# Patient Record
Sex: MALE | Race: WHITE | URBAN - METROPOLITAN AREA
[De-identification: names, ages, dates, MRNs, and addresses within clinical notes are randomized per-mention and may not be internally consistent; named-entity substitution may affect disease eponyms.]

---

## 2019-08-14 ENCOUNTER — OFFICE VISIT (OUTPATIENT)
Dept: NEUROLOGY | Facility: CLINIC | Age: 34
End: 2019-08-14
Payer: COMMERCIAL

## 2019-08-14 VITALS
DIASTOLIC BLOOD PRESSURE: 70 MMHG | SYSTOLIC BLOOD PRESSURE: 108 MMHG | RESPIRATION RATE: 14 BRPM | WEIGHT: 186 LBS | HEART RATE: 70 BPM

## 2019-08-14 DIAGNOSIS — K50.00 CROHN'S DISEASE OF SMALL INTESTINE WITHOUT COMPLICATION (HCC): ICD-10-CM

## 2019-08-14 DIAGNOSIS — G43.109 MIGRAINE WITH AURA AND WITHOUT STATUS MIGRAINOSUS, NOT INTRACTABLE: Primary | ICD-10-CM

## 2019-08-14 PROCEDURE — 99204 OFFICE O/P NEW MOD 45 MIN: CPT | Performed by: OTHER

## 2019-08-14 RX ORDER — TOPIRAMATE 50 MG/1
TABLET, FILM COATED ORAL 2 TIMES DAILY
Refills: 6 | COMMUNITY
Start: 2019-06-27

## 2019-08-14 RX ORDER — SOLIFENACIN SUCCINATE 5 MG/1
5 TABLET, FILM COATED ORAL
Refills: 3 | COMMUNITY
Start: 2019-03-25

## 2019-08-14 RX ORDER — SUMATRIPTAN 50 MG/1
TABLET, FILM COATED ORAL AS NEEDED
Refills: 0 | COMMUNITY
Start: 2019-05-12 | End: 2020-03-17

## 2019-08-14 RX ORDER — LANSOPRAZOLE 30 MG/1
30 CAPSULE, DELAYED RELEASE ORAL
Refills: 2 | COMMUNITY
Start: 2019-05-24

## 2019-08-14 RX ORDER — KETOROLAC TROMETHAMINE 10 MG/1
TABLET, FILM COATED ORAL AS NEEDED
Refills: 0 | COMMUNITY
Start: 2019-05-16

## 2019-08-14 RX ORDER — MESALAMINE 375 MG/1
2 CAPSULE, EXTENDED RELEASE ORAL 2 TIMES DAILY
Refills: 2 | COMMUNITY
Start: 2019-05-29

## 2019-08-14 RX ORDER — RIZATRIPTAN BENZOATE 10 MG/1
TABLET, ORALLY DISINTEGRATING ORAL
Qty: 12 TABLET | Refills: 1 | Status: SHIPPED | OUTPATIENT
Start: 2019-08-14

## 2019-08-14 RX ORDER — PENTOSAN POLYSULFATE SODIUM 100 MG/1
CAPSULE, GELATIN COATED ORAL 3 TIMES DAILY
Refills: 3 | COMMUNITY
Start: 2019-07-28

## 2019-08-14 NOTE — PROGRESS NOTES
[id number]   Neurology- INITIAL CLINIC VISIT  2019, 1:32 PM     Antonio Yates Patient Status:  No patient class for patient encounter    1985 MRN [id number]   [facility] maternal grandmother; Cancer in his father; Clotting Disorder in his maternal grandmother; Migraines in an other family member. Social History:   reports that he quit smoking about 11 years ago. His smoking use included cigarettes.  He has never used smo jaw, palate or tongue weakness or atrophy. Hearing was grossly intact. Shoulder shrug was normal.   Motor exam revealed normal muscle bulk and tone. No atrophy or fasciculations.  Manual muscle testing revealed MRC grade 5/5 strength throughout including pr Benzoate 10 MG Oral Tablet Dispersible 12 tablet 1     Sig: use at onset; may repeat once after 2 hours- ONLY 2 IN 24 HOUR PERIOD MAX. This is a 30 day supply. We discussed in depth regarding the diagnosis, prognosis, treatment.  The patient was gi

## 2019-08-14 NOTE — PATIENT INSTRUCTIONS
After your visit at the [facility] office  today,  please direct any follow up questions or medication needs to the staff in our  [place] office so that your concerns may be promptly addressed.   We are available through [employer] or at the numbers below: must be picked up in office. • Please allow the office 2-3 business days to fill the prescription. • Patient must present photo ID at time of . PLEASE NOTE: PRESCRIPTIONS MUST BE PICKED UP PRIOR TO 3:00PM MONDAY-FRIDAY    Scheduling Tests:     If submitting forms to office staff. • Form completion may require an additional fee. • A signed Release of Information (GISSELLE) must be on file before forms may be submitted. When dropping off forms, please ask the  for this paper.    • Failure

## 2020-01-03 ENCOUNTER — TELEPHONE (OUTPATIENT)
Dept: NEUROLOGY | Facility: CLINIC | Age: 35
End: 2020-01-03

## 2020-01-03 DIAGNOSIS — G43.109 MIGRAINE WITH AURA AND WITHOUT STATUS MIGRAINOSUS, NOT INTRACTABLE: Primary | ICD-10-CM

## 2020-01-03 NOTE — TELEPHONE ENCOUNTER
Spoke with patient who states he discussed taking Toradol with GI doctor and GI doctor was ok with him taking Toradol 10mg tabs. Rx Ketorolac Tromethamine 10mg tab pended for MD review and signature.

## 2020-01-05 RX ORDER — KETOROLAC TROMETHAMINE 10 MG/1
TABLET, FILM COATED ORAL
Qty: 15 TABLET | Refills: 1 | Status: SHIPPED | OUTPATIENT
Start: 2020-01-05 | End: 2020-03-16

## 2020-03-06 DIAGNOSIS — G43.109 MIGRAINE WITH AURA AND WITHOUT STATUS MIGRAINOSUS, NOT INTRACTABLE: Primary | ICD-10-CM

## 2020-03-06 NOTE — TELEPHONE ENCOUNTER
Patient calling to request Sumatriptan. Per LOV notes Maxalt was ordered. Last refill was 8/14/2019 #12/1. Patient states he has had Sumatriptan from previous provider and it worked well. LOV note requested patient f/u in 10 weeks.   Patient inform

## 2020-03-09 RX ORDER — RIZATRIPTAN BENZOATE 10 MG/1
TABLET, ORALLY DISINTEGRATING ORAL
Qty: 12 TABLET | Refills: 0 | Status: CANCELLED | OUTPATIENT
Start: 2020-03-09

## 2020-03-09 NOTE — TELEPHONE ENCOUNTER
Called pt, no answer, left message to call office back. Need pt to schedule follow up appointment, also need to verify which pharmacy pt would like medication sent to.

## 2020-03-12 NOTE — TELEPHONE ENCOUNTER
MLTCB on VM to verify pharmacy Maxalt is to be sent to and give administration instructions of no more than 2-3 times per week to avoid rebound HA.

## 2020-03-13 DIAGNOSIS — G43.109 MIGRAINE WITH AURA AND WITHOUT STATUS MIGRAINOSUS, NOT INTRACTABLE: ICD-10-CM

## 2020-03-13 NOTE — TELEPHONE ENCOUNTER
Received refill request for Ketorolac Tromethamine 10 MG Oral Tab. Pt scheduled follow up for 04/27/2020    Medication: Ketorolac Tromethamine 10 MG Oral Tab    Date of last refill: 01/05/2020 (#15/1)  [place]      [facility]

## 2020-03-16 RX ORDER — KETOROLAC TROMETHAMINE 10 MG/1
TABLET, FILM COATED ORAL
Qty: 15 TABLET | Refills: 1 | Status: SHIPPED | OUTPATIENT
Start: 2020-03-16 | End: 2020-05-28

## 2020-03-17 NOTE — TELEPHONE ENCOUNTER
Spoke with pt and he would prefer sumatriptan rather than rizatriptan. New rx pended for provider review.

## 2020-03-20 RX ORDER — SUMATRIPTAN 50 MG/1
TABLET, FILM COATED ORAL
Qty: 12 TABLET | Refills: 0 | Status: SHIPPED | OUTPATIENT
Start: 2020-03-20 | End: 2020-04-21

## 2020-04-14 ENCOUNTER — TELEPHONE (OUTPATIENT)
Dept: NEUROLOGY | Facility: CLINIC | Age: 35
End: 2020-04-14

## 2020-04-21 DIAGNOSIS — G43.109 MIGRAINE WITH AURA AND WITHOUT STATUS MIGRAINOSUS, NOT INTRACTABLE: ICD-10-CM

## 2020-04-21 RX ORDER — SUMATRIPTAN 50 MG/1
TABLET, FILM COATED ORAL
Qty: 9 TABLET | Refills: 0 | Status: SHIPPED | OUTPATIENT
Start: 2020-04-21

## 2020-04-21 NOTE — TELEPHONE ENCOUNTER
Per TE 3/6/2020, switched from Rizatriptan to Sumatriptan     Medication: Sumatriptan     Date of last refill: 3/20/20 for #12/0 additional refills  [place]    Last office visit: 8/14/19  Due back to clinic per last

## 2020-05-26 ENCOUNTER — VIRTUAL PHONE E/M (OUTPATIENT)
Dept: NEUROLOGY | Facility: CLINIC | Age: 35
End: 2020-05-26
Payer: COMMERCIAL

## 2020-05-26 DIAGNOSIS — G43.109 MIGRAINE WITH AURA AND WITHOUT STATUS MIGRAINOSUS, NOT INTRACTABLE: ICD-10-CM

## 2020-05-26 DIAGNOSIS — D35.2 PITUITARY ADENOMA (HCC): ICD-10-CM

## 2020-05-26 DIAGNOSIS — K50.00 CROHN'S DISEASE OF SMALL INTESTINE WITHOUT COMPLICATION (HCC): ICD-10-CM

## 2020-05-26 DIAGNOSIS — G43.109 MIGRAINE WITH AURA AND WITHOUT STATUS MIGRAINOSUS, NOT INTRACTABLE: Primary | ICD-10-CM

## 2020-05-26 PROCEDURE — 99443 PHONE E/M BY PHYS 21-30 MIN: CPT | Performed by: OTHER

## 2020-05-26 NOTE — PROGRESS NOTES
Virtual Telephone Check-In    [address] verbally consents to a Virtual/Telephone Check-In visit on 05/26/20. Patient has been referred to the [facility] website at [email or web address] to review the yearly Consent to Treat document.     Patient under (gastroesophageal reflux disease)    • Interstitial cystitis    • Migraines     Occular   • Osteopenia    • Pituitary adenoma [facility])         Past Surgical History:  Past Surgical History:   Procedure Laterality Date   • ANESTH,SURGERY OF SHOULDER Right 2017 A comprehensive 10 point review of systems was completed.     Pertinent positives and negatives noted in the HPI. PHYSICAL EXAM:   Neurologic Exam  Vitals  There were no vitals filed for this visit.   Vitals from 8/14/19 reviewed, specifically blo compared, and if there is an increase in size, I will recommend a neurosurgery evaluation.       Requested Prescriptions      No prescriptions requested or ordered in this encounter          We discussed in depth regarding the diagnosis, prognosis, treatmen

## 2020-05-28 RX ORDER — KETOROLAC TROMETHAMINE 10 MG/1
TABLET, FILM COATED ORAL
Qty: 15 TABLET | Refills: 1 | Status: SHIPPED | OUTPATIENT
Start: 2020-05-28 | End: 2020-07-30

## 2020-05-28 NOTE — TELEPHONE ENCOUNTER
Medication: KETOROLAC TROMETHAMINE 10 MG     Date of last refill: 3/16/20 (#15/1)  [place]     Last office visit: 5/26/20  Due back to clinic per last office note:  5 months  Date next office visit scheduled:  No future

## 2020-07-29 DIAGNOSIS — G43.109 MIGRAINE WITH AURA AND WITHOUT STATUS MIGRAINOSUS, NOT INTRACTABLE: ICD-10-CM

## 2020-07-29 NOTE — TELEPHONE ENCOUNTER
Medication: KETOROLAC TROMETHAMINE 10 MG Oral Tab    Date of last refill: 05/28/2020 (#15/1)  [place]    Last office visit: 05/26/2020  Due back to clinic per last office note:  Around 10/26/2020  Date next office vi

## 2020-07-30 RX ORDER — KETOROLAC TROMETHAMINE 10 MG/1
TABLET, FILM COATED ORAL
Qty: 15 TABLET | Refills: 1 | Status: SHIPPED | OUTPATIENT
Start: 2020-07-30 | End: 2020-10-14

## 2020-10-14 DIAGNOSIS — G43.109 MIGRAINE WITH AURA AND WITHOUT STATUS MIGRAINOSUS, NOT INTRACTABLE: ICD-10-CM

## 2020-10-14 RX ORDER — KETOROLAC TROMETHAMINE 10 MG/1
TABLET, FILM COATED ORAL
Qty: 15 TABLET | Refills: 1 | Status: SHIPPED | OUTPATIENT
Start: 2020-10-14 | End: 2020-12-29

## 2020-10-14 NOTE — TELEPHONE ENCOUNTER
Medication: KETOROLAC TROMETHAMINE 10 MG Oral Tab    Date of last refill: 7/30/2020 (#15/1)  [place]    Last office visit: 5/26/2020  Due back to clinic per last office note:  5 months  Date next office visit schedul

## 2020-12-26 DIAGNOSIS — G43.109 MIGRAINE WITH AURA AND WITHOUT STATUS MIGRAINOSUS, NOT INTRACTABLE: ICD-10-CM

## 2020-12-28 NOTE — TELEPHONE ENCOUNTER
Alva't scheduled on 2/18.     Medication: KETOROLAC TROMETHAMINE 10 MG     Date of last refill: 10/14/20 (#15/1)  [place]     Last office visit: 5/26/20  Due back to clinic per last office note:  5 months  Date next office

## 2020-12-29 RX ORDER — KETOROLAC TROMETHAMINE 10 MG/1
TABLET, FILM COATED ORAL
Qty: 15 TABLET | Refills: 1 | Status: SHIPPED | OUTPATIENT
Start: 2020-12-29

## 2021-02-15 ENCOUNTER — TELEPHONE (OUTPATIENT)
Dept: NEUROLOGY | Facility: CLINIC | Age: 36
End: 2021-02-15